# Patient Record
(demographics unavailable — no encounter records)

---

## 2024-10-26 NOTE — HEALTH RISK ASSESSMENT
[Good] : ~his/her~  mood as  good [Yes] : Yes [2 - 4 times a month (2 pts)] : 2-4 times a month (2 points) [1 or 2 (0 pts)] : 1 or 2 (0 points) [Never (0 pts)] : Never (0 points) [No falls in past year] : Patient reported no falls in the past year [0] : 2) Feeling down, depressed, or hopeless: Not at all (0) [Never] : Never [HIV Test offered] : HIV Test offered [Hepatitis C test offered] : Hepatitis C test offered [None] : None [With Significant Other] : lives with significant other [Employed] : employed [Sexually Active] : sexually active [Fully functional (bathing, dressing, toileting, transferring, walking, feeding)] : Fully functional (bathing, dressing, toileting, transferring, walking, feeding) [Fully functional (using the telephone, shopping, preparing meals, housekeeping, doing laundry, using] : Fully functional and needs no help or supervision to perform IADLs (using the telephone, shopping, preparing meals, housekeeping, doing laundry, using transportation, managing medications and managing finances) [de-identified] : Walking [de-identified] : Eats fast food sometimes [Change in mental status noted] : No change in mental status noted [Reports changes in hearing] : Reports no changes in hearing [Reports changes in vision] : Reports no changes in vision [Reports changes in dental health] : Reports no changes in dental health [FreeTextEntry2] : Construction, landscaping

## 2024-10-26 NOTE — PLAN
[FreeTextEntry1] : # Prediabetes  - Advised lifestyle modification and dietary modification  # HTN  - patient is supposed to be on lisinopril  - It appears the most recent one prescribed was lisinopril 5 mg QD  - patient is not taking it for some time now  - BP in clinic 136/78 today  - Advised DASH diet - Advised to check BP at home  - Advised that if it is consistently above 140/90, should start taking lisinopril   RTC for BP management in 3 months   Case d/w Dr. Brian

## 2024-10-26 NOTE — HEALTH RISK ASSESSMENT
[Good] : ~his/her~  mood as  good [Yes] : Yes [2 - 4 times a month (2 pts)] : 2-4 times a month (2 points) [1 or 2 (0 pts)] : 1 or 2 (0 points) [Never (0 pts)] : Never (0 points) [No falls in past year] : Patient reported no falls in the past year [0] : 2) Feeling down, depressed, or hopeless: Not at all (0) [Never] : Never [HIV Test offered] : HIV Test offered [Hepatitis C test offered] : Hepatitis C test offered [None] : None [With Significant Other] : lives with significant other [Employed] : employed [Sexually Active] : sexually active [Fully functional (bathing, dressing, toileting, transferring, walking, feeding)] : Fully functional (bathing, dressing, toileting, transferring, walking, feeding) [Fully functional (using the telephone, shopping, preparing meals, housekeeping, doing laundry, using] : Fully functional and needs no help or supervision to perform IADLs (using the telephone, shopping, preparing meals, housekeeping, doing laundry, using transportation, managing medications and managing finances) [de-identified] : Walking [de-identified] : Eats fast food sometimes [Change in mental status noted] : No change in mental status noted [Reports changes in hearing] : Reports no changes in hearing [Reports changes in vision] : Reports no changes in vision [Reports changes in dental health] : Reports no changes in dental health [FreeTextEntry2] : Construction, landscaping

## 2024-10-26 NOTE — HISTORY OF PRESENT ILLNESS
[FreeTextEntry1] : CPE  [de-identified] : 49-year-old male with past medical history of hypertension, prediabetes, hyperlipidemia, hearing loss presenting today for a CPE. Patient reports that his hearing has gotten better. He does have an appointment coming up with ENT on December 16. Patient denied any complaints for today. He is up-to-date with his FIT test, which was negative.

## 2024-10-26 NOTE — HISTORY OF PRESENT ILLNESS
[FreeTextEntry1] : CPE  [de-identified] : 49-year-old male with past medical history of hypertension, prediabetes, hyperlipidemia, hearing loss presenting today for a CPE. Patient reports that his hearing has gotten better. He does have an appointment coming up with ENT on December 16. Patient denied any complaints for today. He is up-to-date with his FIT test, which was negative.

## 2024-10-26 NOTE — PHYSICAL EXAM
[Well Nourished] : well nourished [PERRL] : pupils equal round and reactive to light [EOMI] : extraocular movements intact [No Lymphadenopathy] : no lymphadenopathy [No Respiratory Distress] : no respiratory distress  [Clear to Auscultation] : lungs were clear to auscultation bilaterally [Normal Rate] : normal rate  [Normal S1, S2] : normal S1 and S2 [Soft] : abdomen soft [Non Tender] : non-tender [Non-distended] : non-distended [Normal Supraclavicular Nodes] : no supraclavicular lymphadenopathy [Normal Posterior Cervical Nodes] : no posterior cervical lymphadenopathy [Normal Anterior Cervical Nodes] : no anterior cervical lymphadenopathy [No Rash] : no rash [Coordination Grossly Intact] : coordination grossly intact [No Focal Deficits] : no focal deficits [Normal Affect] : the affect was normal [Normal Insight/Judgement] : insight and judgment were intact [Normal Outer Ear/Nose] : the outer ears and nose were normal in appearance [Normal Oropharynx] : the oropharynx was normal [Normal TMs] : both tympanic membranes were normal

## 2024-12-31 NOTE — PHYSICAL EXAM
[Normal] : no acute distress, well nourished, well developed and well-appearing [No Respiratory Distress] : no respiratory distress  [de-identified] : pain along lateral right lower rib. no bruising. Able to take full breaths

## 2024-12-31 NOTE — INTERPRETER SERVICES
[[employer] ] : provided by [employer]   [Interpreters_IDNumber] : [id number] [Interpreters_FullName] : greer [TWNoteComboBox1] : Afghan

## 2024-12-31 NOTE — PHYSICAL EXAM
[Normal] : no acute distress, well nourished, well developed and well-appearing [No Respiratory Distress] : no respiratory distress  [de-identified] : pain along lateral right lower rib. no bruising. Able to take full breaths

## 2024-12-31 NOTE — INTERPRETER SERVICES
[[employer] ] : provided by [employer]   [Interpreters_IDNumber] : [id number] [Interpreters_FullName] : greer [TWNoteComboBox1] : Slovenian

## 2024-12-31 NOTE — INTERPRETER SERVICES
[[employer] ] : provided by [employer]   [Interpreters_IDNumber] : [id number] [Interpreters_FullName] : greer [TWNoteComboBox1] : Lao

## 2024-12-31 NOTE — HISTORY OF PRESENT ILLNESS
[FreeTextEntry8] : 51 yo male presenting as a walk in for right sided rib pain, He was working last Thursday (1 week ago) and fell off his ladder and fell onto the ladder. Since then, he has had right sided rib pain worse when he is moving or taking a really deep breath. He takes tylenol for the pain but the pain is persistent.

## 2024-12-31 NOTE — HISTORY OF PRESENT ILLNESS
[FreeTextEntry8] : 49 yo male presenting as a walk in for right sided rib pain, He was working last Thursday (1 week ago) and fell off his ladder and fell onto the ladder. Since then, he has had right sided rib pain worse when he is moving or taking a really deep breath. He takes tylenol for the pain but the pain is persistent.

## 2024-12-31 NOTE — PHYSICAL EXAM
[Normal] : no acute distress, well nourished, well developed and well-appearing [No Respiratory Distress] : no respiratory distress  [de-identified] : pain along lateral right lower rib. no bruising. Able to take full breaths

## 2025-01-15 NOTE — PHYSICAL EXAM
[No Acute Distress] : no acute distress [Well Nourished] : well nourished [Well Developed] : well developed [Normal Sclera/Conjunctiva] : normal sclera/conjunctiva [EOMI] : extraocular movements intact [Normal Outer Ear/Nose] : the outer ears and nose were normal in appearance [Normal Oropharynx] : the oropharynx was normal [Normal TMs] : both tympanic membranes were normal [Supple] : supple [No Respiratory Distress] : no respiratory distress  [No Accessory Muscle Use] : no accessory muscle use [Clear to Auscultation] : lungs were clear to auscultation bilaterally [Normal Rate] : normal rate  [Regular Rhythm] : with a regular rhythm [Normal S1, S2] : normal S1 and S2 [Normal Gait] : normal gait [Normal Affect] : the affect was normal [Alert and Oriented x3] : oriented to person, place, and time [Normal Insight/Judgement] : insight and judgment were intact [Soft] : abdomen soft [Non Tender] : non-tender [Non-distended] : non-distended [Normal Bowel Sounds] : normal bowel sounds [de-identified] : +Cerumen in b/l ears [de-identified] : +TTP of R lower ribs

## 2025-01-15 NOTE — PHYSICAL EXAM
[No Acute Distress] : no acute distress [Well Nourished] : well nourished [Well Developed] : well developed [Normal Sclera/Conjunctiva] : normal sclera/conjunctiva [EOMI] : extraocular movements intact [Normal Outer Ear/Nose] : the outer ears and nose were normal in appearance [Normal Oropharynx] : the oropharynx was normal [Normal TMs] : both tympanic membranes were normal [Supple] : supple [No Respiratory Distress] : no respiratory distress  [No Accessory Muscle Use] : no accessory muscle use [Clear to Auscultation] : lungs were clear to auscultation bilaterally [Normal Rate] : normal rate  [Regular Rhythm] : with a regular rhythm [Normal S1, S2] : normal S1 and S2 [Normal Gait] : normal gait [Normal Affect] : the affect was normal [Alert and Oriented x3] : oriented to person, place, and time [Normal Insight/Judgement] : insight and judgment were intact [Soft] : abdomen soft [Non Tender] : non-tender [Non-distended] : non-distended [Normal Bowel Sounds] : normal bowel sounds [de-identified] : +Cerumen in b/l ears [de-identified] : +TTP of R lower ribs

## 2025-01-15 NOTE — HISTORY OF PRESENT ILLNESS
[FreeTextEntry1] : f/u rib pain [de-identified] : 50-year-old male with PMHx of hypertension, prediabetes, hyperlipidemia, hearing loss, presenting for follow-up of R rib pain. Last seen on 12/26/24 after fall injury from ladder. Alternating PRN Tylenol and naproxen. XR Ribs done and negative for fractures, except for 5mm calcified granuloma. He took naproxen for 7 days with modest improvement, without side effects. Currently rates pain as 4/10. It is worst when laying on that side or twisting. The pain resolves when he is seated. He also presents for BP checkup. He was on lisinopril 5mg but stopped taking, no reason given. He has been checking his BP at home and reports his levels are normal but was unable to provide the numbers. His hearing is back to baseline, he didn't feel to continue following up with ENT. Denies fevers, chills, fatigue, night sweats, chest pain, palpitations, SOB, cough, dyspnea on exertion, nausea, vomiting, diarrhea, urinary frequency or burning with urination.

## 2025-01-15 NOTE — HISTORY OF PRESENT ILLNESS
[FreeTextEntry1] : f/u rib pain [de-identified] : 50-year-old male with PMHx of hypertension, prediabetes, hyperlipidemia, hearing loss, presenting for follow-up of R rib pain. Last seen on 12/26/24 after fall injury from ladder. Alternating PRN Tylenol and naproxen. XR Ribs done and negative for fractures, except for 5mm calcified granuloma. He took naproxen for 7 days with modest improvement, without side effects. Currently rates pain as 4/10. It is worst when laying on that side or twisting. The pain resolves when he is seated. He also presents for BP checkup. He was on lisinopril 5mg but stopped taking, no reason given. He has been checking his BP at home and reports his levels are normal but was unable to provide the numbers. His hearing is back to baseline, he didn't feel to continue following up with ENT. Denies fevers, chills, fatigue, night sweats, chest pain, palpitations, SOB, cough, dyspnea on exertion, nausea, vomiting, diarrhea, urinary frequency or burning with urination.

## 2025-03-05 NOTE — HISTORY OF PRESENT ILLNESS
[FreeTextEntry1] : BP check and dizziness [de-identified] : 49 y/o M with PMH of HTN currently non-adherent with his lisinopril presenting for a BP check. Incidentally during today's visit, also stated the he had a work related injury in january where a heavy load of bricks fell on his head. He reported that He was admitted to outside hospital (likely in [place]) where they did multiple CT head and was told that he might have some bleed. However, he was discharged from the hospital and reportedly is still having some dizziness and vertigo since he was discharged from the hospital. Patient denied tinnitus or hearing problem but reports that his dizziness is accentuated when he moves around.  Of note, patient may have limited health literacy.

## 2025-03-05 NOTE — PHYSICAL EXAM
[Well Nourished] : well nourished [PERRL] : pupils equal round and reactive to light [EOMI] : extraocular movements intact [No Lymphadenopathy] : no lymphadenopathy [No Respiratory Distress] : no respiratory distress  [Clear to Auscultation] : lungs were clear to auscultation bilaterally [Normal Rate] : normal rate  [Normal S1, S2] : normal S1 and S2 [Soft] : abdomen soft [Non Tender] : non-tender [Non-distended] : non-distended [Normal Supraclavicular Nodes] : no supraclavicular lymphadenopathy [Normal Posterior Cervical Nodes] : no posterior cervical lymphadenopathy [Normal Anterior Cervical Nodes] : no anterior cervical lymphadenopathy [No Rash] : no rash [Coordination Grossly Intact] : coordination grossly intact [No Focal Deficits] : no focal deficits [Memory Grossly Normal] : memory grossly normal [Normal Affect] : the affect was normal [Normal Insight/Judgement] : insight and judgment were intact [Normal Gait] : normal gait [de-identified] : TM clear  [de-identified] : [place] hallpike showed some nystagmus and reproduced his vertigo. No dysmetria or dysdiadochokinesia observed

## 2025-03-05 NOTE — PLAN
[FreeTextEntry1] : # Vertigo  # Post-concussion syndrome - Likely a sequela of post-concussion syndrome  - May have a component of BPPV  - Trial af meclizine as above  - Instructed to refrain from operating heavy machinery when taking meclizine - Instructed to stay away from mentally demanding and physically demanding job for the time being - Neurology referral and MR head  - RTC after MR head  - Bring all medical records from outside hospital including imaging studies   # HTN - Non-adherent with his medication  - instructed patient to take lisinopril QD  - DASH diet   Case d/w Dr. Lazo

## 2025-03-05 NOTE — HISTORY OF PRESENT ILLNESS
[FreeTextEntry1] : BP check and dizziness [de-identified] : 49 y/o M with PMH of HTN currently non-adherent with his lisinopril presenting for a BP check. Incidentally during today's visit, also stated the he had a work related injury in january where a heavy load of bricks fell on his head. He reported that He was admitted to outside hospital (likely in [place]) where they did multiple CT head and was told that he might have some bleed. However, he was discharged from the hospital and reportedly is still having some dizziness and vertigo since he was discharged from the hospital. Patient denied tinnitus or hearing problem but reports that his dizziness is accentuated when he moves around.  Of note, patient may have limited health literacy.

## 2025-03-05 NOTE — PHYSICAL EXAM
[Well Nourished] : well nourished [PERRL] : pupils equal round and reactive to light [EOMI] : extraocular movements intact [No Lymphadenopathy] : no lymphadenopathy [No Respiratory Distress] : no respiratory distress  [Clear to Auscultation] : lungs were clear to auscultation bilaterally [Normal Rate] : normal rate  [Normal S1, S2] : normal S1 and S2 [Soft] : abdomen soft [Non Tender] : non-tender [Non-distended] : non-distended [Normal Supraclavicular Nodes] : no supraclavicular lymphadenopathy [Normal Posterior Cervical Nodes] : no posterior cervical lymphadenopathy [Normal Anterior Cervical Nodes] : no anterior cervical lymphadenopathy [No Rash] : no rash [Coordination Grossly Intact] : coordination grossly intact [No Focal Deficits] : no focal deficits [Memory Grossly Normal] : memory grossly normal [Normal Affect] : the affect was normal [Normal Insight/Judgement] : insight and judgment were intact [Normal Gait] : normal gait [de-identified] : TM clear  [de-identified] : [place] hallpike showed some nystagmus and reproduced his vertigo. No dysmetria or dysdiadochokinesia observed

## 2025-03-23 NOTE — HISTORY OF PRESENT ILLNESS
[FreeTextEntry1] : Follow up for dizziness and TBI [de-identified] : 51 y/o M with PMH of HTN currently non-adherent with his lisinopril presenting for a follow up visit. patient was seen on March 5th after he was discharge from OSH after sustaining a TBI when bricks fell on his head. He reported that He was admitted to outside hospital (likely in [place]) where they did multiple CT head and was told that he might have some bleed. We do not have the records from the OSH and the patient was advised to bring all the medical records from OSH. patient still had some dizziness and meclizine was prescribed. Today he brought his d/c paperwork and as per paperwork, patient was supposed to be on keppra 500mg BID according to d/c paperwork. We do not have the CT head from OSH. patient reports minimal to no improvement with meclizine. Reports that he is still not taking his lisinopril 5mg QD. he was also unable to do the MRI head that we ordered. He has not followed up with neurology. The discharge paperwork also asked him to follow up with Ophthalmology and Neurosurgery.

## 2025-03-23 NOTE — PLAN
[FreeTextEntry1] : - patient likely had a brain bleed  - Seizures? unclear but he was discharged on keppra 500mg BID which he has not been taking since running out of the medication - Meclizine is not really helping him  - [place] hallpike was positive for vertigo - advised him to trial eplely maneuver at home to see if it gives him symptomatic relief  - Advised to stop meclizine and start Keppra and immediately follow up with neurosurgery and neurology - MRI head ASAP - Ophthalmology visit ASAP  - Needs to bring medical records from OSH - Will inform  to try to obtain medical records from our end  - patient's care will be better at [facility]   RTC s/p all of the above visits   Case d/w Dr. Lazo

## 2025-03-23 NOTE — HISTORY OF PRESENT ILLNESS
[FreeTextEntry1] : Follow up for dizziness and TBI [de-identified] : 49 y/o M with PMH of HTN currently non-adherent with his lisinopril presenting for a follow up visit. patient was seen on March 5th after he was discharge from OSH after sustaining a TBI when bricks fell on his head. He reported that He was admitted to outside hospital (likely in [place]) where they did multiple CT head and was told that he might have some bleed. We do not have the records from the OSH and the patient was advised to bring all the medical records from OSH. patient still had some dizziness and meclizine was prescribed. Today he brought his d/c paperwork and as per paperwork, patient was supposed to be on keppra 500mg BID according to d/c paperwork. We do not have the CT head from OSH. patient reports minimal to no improvement with meclizine. Reports that he is still not taking his lisinopril 5mg QD. he was also unable to do the MRI head that we ordered. He has not followed up with neurology. The discharge paperwork also asked him to follow up with Ophthalmology and Neurosurgery.

## 2025-03-23 NOTE — REVIEW OF SYSTEMS
[Dizziness] : dizziness [Negative] : Heme/Lymph [Fever] : no fever [Chills] : no chills [Fatigue] : no fatigue [Headache] : no headache [Fainting] : no fainting [Confusion] : no confusion [Unsteady Walk] : no ataxia [Memory Loss] : no memory loss [FreeTextEntry2] : Dizziness mainly on movement

## 2025-03-23 NOTE — PLAN
[FreeTextEntry1] : - patient likely had a brain bleed  - Seizures? unclear but he was discharged on keppra 500mg BID which he has not been taking since running out of the medication - Meclizine is not really helping him  - [place] hallpike was positive for vertigo - advised him to trial eplely maneuver at home to see if it gives him symptomatic relief  - Advised to stop meclizine and start Keppra and immediately follow up with neurosurgery and neurology - MRI head ASAP - Ophthalmology visit ASAP  - Needs to bring medical records from OSH - Will inform  to try to obtain medical records from our end  - patient's care will be better at [facility]   RTC s/p all of the above visits   Case d/w Dr. Lazo

## 2025-03-23 NOTE — PHYSICAL EXAM
[Well Nourished] : well nourished [PERRL] : pupils equal round and reactive to light [EOMI] : extraocular movements intact [Normal Oropharynx] : the oropharynx was normal [No Lymphadenopathy] : no lymphadenopathy [No Respiratory Distress] : no respiratory distress  [Clear to Auscultation] : lungs were clear to auscultation bilaterally [Normal Rate] : normal rate  [Normal S1, S2] : normal S1 and S2 [Soft] : abdomen soft [Non Tender] : non-tender [Non-distended] : non-distended [No HSM] : no HSM [Normal Supraclavicular Nodes] : no supraclavicular lymphadenopathy [Normal Posterior Cervical Nodes] : no posterior cervical lymphadenopathy [Normal Anterior Cervical Nodes] : no anterior cervical lymphadenopathy [No Rash] : no rash [Normal Affect] : the affect was normal [Normal Insight/Judgement] : insight and judgment were intact [de-identified] : [place] hallpike maneuver reproduced vertigo, no balance disturbance observed, no focal deficits observed, Facial symmetry preserved, no dysmetria or dysdiadochokinesia

## 2025-03-23 NOTE — PHYSICAL EXAM
[Well Nourished] : well nourished [PERRL] : pupils equal round and reactive to light [EOMI] : extraocular movements intact [Normal Oropharynx] : the oropharynx was normal [No Lymphadenopathy] : no lymphadenopathy [No Respiratory Distress] : no respiratory distress  [Clear to Auscultation] : lungs were clear to auscultation bilaterally [Normal Rate] : normal rate  [Normal S1, S2] : normal S1 and S2 [Soft] : abdomen soft [Non Tender] : non-tender [Non-distended] : non-distended [No HSM] : no HSM [Normal Supraclavicular Nodes] : no supraclavicular lymphadenopathy [Normal Posterior Cervical Nodes] : no posterior cervical lymphadenopathy [Normal Anterior Cervical Nodes] : no anterior cervical lymphadenopathy [No Rash] : no rash [Normal Affect] : the affect was normal [Normal Insight/Judgement] : insight and judgment were intact [de-identified] : [place] hallpike maneuver reproduced vertigo, no balance disturbance observed, no focal deficits observed, Facial symmetry preserved, no dysmetria or dysdiadochokinesia